# Patient Record
Sex: FEMALE | Race: WHITE | Employment: PART TIME | URBAN - METROPOLITAN AREA
[De-identification: names, ages, dates, MRNs, and addresses within clinical notes are randomized per-mention and may not be internally consistent; named-entity substitution may affect disease eponyms.]

---

## 2019-08-16 ENCOUNTER — OFFICE VISIT (OUTPATIENT)
Dept: FAMILY MEDICINE CLINIC | Age: 34
End: 2019-08-16
Payer: COMMERCIAL

## 2019-08-16 VITALS
SYSTOLIC BLOOD PRESSURE: 96 MMHG | RESPIRATION RATE: 16 BRPM | TEMPERATURE: 97.5 F | DIASTOLIC BLOOD PRESSURE: 64 MMHG | WEIGHT: 159 LBS | BODY MASS INDEX: 24.96 KG/M2 | HEIGHT: 67 IN | HEART RATE: 56 BPM

## 2019-08-16 DIAGNOSIS — Z80.3 FAMILY HISTORY OF MALIGNANT NEOPLASM OF BREAST IN RELATIVE DIAGNOSED WHEN YOUNGER THAN 45 YEARS OF AGE: ICD-10-CM

## 2019-08-16 DIAGNOSIS — Z12.31 ENCOUNTER FOR SCREENING MAMMOGRAM FOR MALIGNANT NEOPLASM OF BREAST: ICD-10-CM

## 2019-08-16 DIAGNOSIS — L30.9 VULVAR DERMATITIS: Primary | ICD-10-CM

## 2019-08-16 PROCEDURE — G8427 DOCREV CUR MEDS BY ELIG CLIN: HCPCS | Performed by: FAMILY MEDICINE

## 2019-08-16 PROCEDURE — 99203 OFFICE O/P NEW LOW 30 MIN: CPT | Performed by: FAMILY MEDICINE

## 2019-08-16 PROCEDURE — 1036F TOBACCO NON-USER: CPT | Performed by: FAMILY MEDICINE

## 2019-08-16 PROCEDURE — G8419 CALC BMI OUT NRM PARAM NOF/U: HCPCS | Performed by: FAMILY MEDICINE

## 2019-08-16 RX ORDER — CLOBETASOL PROPIONATE 0.5 MG/G
OINTMENT TOPICAL
Qty: 30 G | Refills: 1 | Status: SHIPPED | OUTPATIENT
Start: 2019-08-16 | End: 2020-06-19

## 2019-08-16 ASSESSMENT — PATIENT HEALTH QUESTIONNAIRE - PHQ9
1. LITTLE INTEREST OR PLEASURE IN DOING THINGS: 0
SUM OF ALL RESPONSES TO PHQ QUESTIONS 1-9: 0
2. FEELING DOWN, DEPRESSED OR HOPELESS: 0
SUM OF ALL RESPONSES TO PHQ QUESTIONS 1-9: 0
SUM OF ALL RESPONSES TO PHQ9 QUESTIONS 1 & 2: 0

## 2019-08-17 ASSESSMENT — ENCOUNTER SYMPTOMS
ABDOMINAL PAIN: 0
DIARRHEA: 0
BLOOD IN STOOL: 0
NAUSEA: 0
EYES NEGATIVE: 1
SHORTNESS OF BREATH: 0
VOMITING: 0

## 2019-09-20 ENCOUNTER — HOSPITAL ENCOUNTER (OUTPATIENT)
Dept: MAMMOGRAPHY | Age: 34
Discharge: HOME | End: 2019-09-20
Payer: COMMERCIAL

## 2019-09-20 DIAGNOSIS — Z12.39 BREAST CANCER SCREENING: ICD-10-CM

## 2019-09-20 PROCEDURE — 77063 BREAST TOMOSYNTHESIS BI: CPT

## 2020-06-19 ENCOUNTER — OFFICE VISIT (OUTPATIENT)
Dept: FAMILY MEDICINE CLINIC | Age: 35
End: 2020-06-19
Payer: COMMERCIAL

## 2020-06-19 VITALS
TEMPERATURE: 98.1 F | WEIGHT: 151.8 LBS | HEART RATE: 88 BPM | SYSTOLIC BLOOD PRESSURE: 110 MMHG | BODY MASS INDEX: 23.83 KG/M2 | HEIGHT: 67 IN | RESPIRATION RATE: 14 BRPM | DIASTOLIC BLOOD PRESSURE: 60 MMHG

## 2020-06-19 PROCEDURE — 99395 PREV VISIT EST AGE 18-39: CPT | Performed by: FAMILY MEDICINE

## 2020-06-19 ASSESSMENT — ENCOUNTER SYMPTOMS
EYES NEGATIVE: 1
NAUSEA: 0
ABDOMINAL PAIN: 0
VOMITING: 0
SHORTNESS OF BREATH: 0
BLOOD IN STOOL: 0
DIARRHEA: 0

## 2020-06-19 ASSESSMENT — PATIENT HEALTH QUESTIONNAIRE - PHQ9
2. FEELING DOWN, DEPRESSED OR HOPELESS: 0
1. LITTLE INTEREST OR PLEASURE IN DOING THINGS: 0
SUM OF ALL RESPONSES TO PHQ QUESTIONS 1-9: 0
SUM OF ALL RESPONSES TO PHQ9 QUESTIONS 1 & 2: 0
SUM OF ALL RESPONSES TO PHQ QUESTIONS 1-9: 0

## 2020-06-19 NOTE — PROGRESS NOTES
atraumatic. Right Ear: Tympanic membrane normal.      Left Ear: Tympanic membrane normal.      Mouth/Throat:      Mouth: Mucous membranes are moist.      Pharynx: No posterior oropharyngeal erythema. Eyes:      Conjunctiva/sclera: Conjunctivae normal.   Neck:      Musculoskeletal: Neck supple. Thyroid: No thyromegaly. Vascular: No carotid bruit. Cardiovascular:      Rate and Rhythm: Normal rate and regular rhythm. Heart sounds: No murmur. Pulmonary:      Effort: Pulmonary effort is normal.      Breath sounds: Normal breath sounds. No wheezing. Abdominal:      General: Bowel sounds are normal.      Palpations: Abdomen is soft. Tenderness: There is no abdominal tenderness. There is no guarding or rebound. Genitourinary:     General: Normal vulva. Vagina: No vaginal discharge. Comments: External genitalia normal.  Spec exam reveals parous cervical os without lesion. Pap  Collected. Bimanual exam within normal limits. Musculoskeletal:      Right shoulder: Normal. She exhibits normal range of motion and no crepitus. Cervical back: She exhibits spasm. Back:    Lymphadenopathy:      Cervical: No cervical adenopathy. Skin:     General: Skin is warm and dry. Findings: No rash. Neurological:      Mental Status: She is alert and oriented to person, place, and time.    Psychiatric:         Behavior: Behavior normal.             Immunization History   Administered Date(s) Administered    Tdap (Boostrix, Adacel) 02/23/2017         Health Maintenance   Topic Date Due    Flu vaccine (Season Ended) 09/01/2020    Cervical cancer screen  08/09/2022    Breast cancer screen  12/12/2026    DTaP/Tdap/Td vaccine (2 - Td) 02/23/2027    Hepatitis A vaccine  Aged Out    Hepatitis B vaccine  Aged Out    Hib vaccine  Aged Out    Meningococcal (ACWY) vaccine  Aged Out    Pneumococcal 0-64 years Vaccine  Aged Out    Varicella vaccine  Discontinued    HIV screen

## 2020-06-25 LAB — CYTOLOGY THIN PREP PAP: NORMAL

## 2020-06-26 ENCOUNTER — OFFICE VISIT (OUTPATIENT)
Dept: FAMILY MEDICINE CLINIC | Age: 35
End: 2020-06-26
Payer: COMMERCIAL

## 2020-06-26 ENCOUNTER — TELEPHONE (OUTPATIENT)
Dept: FAMILY MEDICINE CLINIC | Age: 35
End: 2020-06-26

## 2020-06-26 VITALS
SYSTOLIC BLOOD PRESSURE: 94 MMHG | WEIGHT: 151.4 LBS | DIASTOLIC BLOOD PRESSURE: 58 MMHG | HEIGHT: 67 IN | TEMPERATURE: 97.2 F | HEART RATE: 64 BPM | BODY MASS INDEX: 23.76 KG/M2 | RESPIRATION RATE: 14 BRPM

## 2020-06-26 PROCEDURE — 99213 OFFICE O/P EST LOW 20 MIN: CPT | Performed by: NURSE PRACTITIONER

## 2020-06-26 ASSESSMENT — ENCOUNTER SYMPTOMS
BLOOD IN STOOL: 0
SHORTNESS OF BREATH: 0
NAUSEA: 0
DIARRHEA: 0
CONSTIPATION: 0
VOMITING: 0

## 2020-06-26 NOTE — PROGRESS NOTES
range of motion. Comments: Firm lump of left breast at 3-4 o' clock position. Skin:     General: Skin is warm and dry. Neurological:      Mental Status: She is alert and oriented to person, place, and time. Deep Tendon Reflexes: Reflexes are normal and symmetric. Psychiatric:         Behavior: Behavior normal.         Thought Content: Thought content normal.         Judgment: Judgment normal.         No results found for this visit on 06/26/20. ASSESSMENT      1.  Lump of left breast        PLAN       Orders Placed This Encounter   Procedures    ERICKA DIGITAL DIAGNOSTIC W OR WO CAD BILATERAL     Standing Status:   Future     Standing Expiration Date:   8/26/2021     Scheduling Instructions:      Ultrasound if needed     Order Specific Question:   Reason for exam:     Answer:   Monday would be great     Diagnostic mammo ordered  Ultrasound if indicated  Follow up as previously scheduled      Electronically signed by ANI Morillo CNP on 6/26/2020 at 11:42 AM

## 2020-07-06 ENCOUNTER — HOSPITAL ENCOUNTER (OUTPATIENT)
Dept: WOMENS IMAGING | Age: 35
Discharge: HOME | End: 2020-07-06
Payer: COMMERCIAL

## 2020-07-06 PROCEDURE — 76642 ULTRASOUND BREAST LIMITED: CPT

## 2020-07-06 PROCEDURE — G0279 TOMOSYNTHESIS, MAMMO: HCPCS

## 2021-06-25 ENCOUNTER — OFFICE VISIT (OUTPATIENT)
Dept: FAMILY MEDICINE CLINIC | Age: 36
End: 2021-06-25
Payer: COMMERCIAL

## 2021-06-25 VITALS
BODY MASS INDEX: 24.81 KG/M2 | DIASTOLIC BLOOD PRESSURE: 66 MMHG | HEART RATE: 68 BPM | RESPIRATION RATE: 12 BRPM | HEIGHT: 66 IN | SYSTOLIC BLOOD PRESSURE: 108 MMHG | WEIGHT: 154.4 LBS

## 2021-06-25 DIAGNOSIS — N63.20 LEFT BREAST LUMP: ICD-10-CM

## 2021-06-25 DIAGNOSIS — Z01.419 ENCOUNTER FOR GYNECOLOGICAL EXAMINATION WITHOUT ABNORMAL FINDING: Primary | ICD-10-CM

## 2021-06-25 PROCEDURE — 99395 PREV VISIT EST AGE 18-39: CPT | Performed by: FAMILY MEDICINE

## 2021-06-25 SDOH — ECONOMIC STABILITY: FOOD INSECURITY: WITHIN THE PAST 12 MONTHS, THE FOOD YOU BOUGHT JUST DIDN'T LAST AND YOU DIDN'T HAVE MONEY TO GET MORE.: NEVER TRUE

## 2021-06-25 SDOH — ECONOMIC STABILITY: FOOD INSECURITY: WITHIN THE PAST 12 MONTHS, YOU WORRIED THAT YOUR FOOD WOULD RUN OUT BEFORE YOU GOT MONEY TO BUY MORE.: NEVER TRUE

## 2021-06-25 ASSESSMENT — PATIENT HEALTH QUESTIONNAIRE - PHQ9
1. LITTLE INTEREST OR PLEASURE IN DOING THINGS: 0
2. FEELING DOWN, DEPRESSED OR HOPELESS: 0
SUM OF ALL RESPONSES TO PHQ QUESTIONS 1-9: 0
SUM OF ALL RESPONSES TO PHQ9 QUESTIONS 1 & 2: 0
SUM OF ALL RESPONSES TO PHQ QUESTIONS 1-9: 0
SUM OF ALL RESPONSES TO PHQ QUESTIONS 1-9: 0

## 2021-06-25 ASSESSMENT — SOCIAL DETERMINANTS OF HEALTH (SDOH)
HOW HARD IS IT FOR YOU TO PAY FOR THE VERY BASICS LIKE FOOD, HOUSING, MEDICAL CARE, AND HEATING?: NOT HARD AT ALL
HOW HARD IS IT FOR YOU TO PAY FOR THE VERY BASICS LIKE FOOD, HOUSING, MEDICAL CARE, AND HEATING?: NOT HARD AT ALL

## 2021-06-25 NOTE — PROGRESS NOTES
2021    No George (:  1985) is a 30 y.o. female, here for a preventive medicine evaluation. Doing well except for a left breast lump. She noticed this last year and had a diagnostic mammogram and US, which was ok. She feels like the area is more prominent now. There is a family history of breast cancer. She is otherwise well. Takes no Rx meds. She works out regularly. Nonsmoker. Body mass index is 24.67 kg/m². She has a h/o an abnormal pap in the distant past.  She has had multiple normals since that time. Review of Systems   Constitutional: Negative for chills, fatigue and fever. HENT: Negative. Eyes: Negative. Respiratory: Negative for shortness of breath. Cardiovascular: Negative for chest pain, palpitations and leg swelling. Gastrointestinal: Negative for abdominal pain, blood in stool, diarrhea, nausea and vomiting. Genitourinary: Negative for dysuria and menstrual problem. Musculoskeletal: Negative for arthralgias and myalgias. Skin: Negative for rash. Neurological: Negative for dizziness and headaches. Hematological: Negative for adenopathy. Psychiatric/Behavioral: Negative. All other systems reviewed and are negative. Prior to Visit Medications    Medication Sig Taking?  Authorizing Provider   TURMERIC PO Take by mouth Yes Historical Provider, MD   COLLAGEN PO Take by mouth Yes Historical Provider, MD        No Known Allergies    Past Medical History:   Diagnosis Date    Migraine        Past Surgical History:   Procedure Laterality Date    SINUS SURGERY      WISDOM TOOTH EXTRACTION  2004       Social History     Socioeconomic History    Marital status:      Spouse name: Not on file    Number of children: Not on file    Years of education: Not on file    Highest education level: Not on file   Occupational History    Not on file   Tobacco Use    Smoking status: Never Smoker    Smokeless tobacco: Never Used   Vaping Use  Vaping Use: Never used   Substance and Sexual Activity    Alcohol use: Yes     Comment: Socially    Drug use: Never    Sexual activity: Yes     Partners: Male     Comment:    Other Topics Concern    Not on file   Social History Narrative    Not on file     Social Determinants of Health     Financial Resource Strain: Low Risk     Difficulty of Paying Living Expenses: Not hard at all   Food Insecurity: No Food Insecurity    Worried About Running Out of Food in the Last Year: Never true    [address] in the Last Year: Never true   Transportation Needs:     Lack of Transportation (Medical):  Lack of Transportation (Non-Medical):    Physical Activity:     Days of Exercise per Week:     Minutes of Exercise per Session:    Stress:     Feeling of Stress :    Social Connections:     Frequency of Communication with Friends and Family:     Frequency of Social Gatherings with Friends and Family:     Attends Nondenominational Services:     Active Member of Clubs or Organizations:     Attends Club or Organization Meetings:     Marital Status:    Intimate Partner Violence:     Fear of Current or Ex-Partner:     Emotionally Abused:     Physically Abused:     Sexually Abused:         Family History   Problem Relation Age of Onset    Breast Cancer Mother 44        Breast    High Blood Pressure Father     Cancer Father         Renal    Other Maternal Grandfather         Amyloidosis     Seizures Brother     Irritable Bowel Syndrome Paternal Grandmother        ADVANCE DIRECTIVE: N, <no information>    Vitals:    06/25/21 1333   BP: 108/66   Pulse: 68   Resp: 12   Weight: 154 lb 6.4 oz (70 kg)   Height: 5' 6.34\" (1.685 m)     Estimated body mass index is 24.67 kg/m² as calculated from the following:    Height as of this encounter: 5' 6.34\" (1.685 m). Weight as of this encounter: 154 lb 6.4 oz (70 kg). Physical Exam  Vitals and nursing note reviewed.    Constitutional:       General: She is not DTaP/Tdap/Td vaccine (2 - Td or Tdap) 02/23/2027    COVID-19 Vaccine  Completed    Hepatitis A vaccine  Aged Out    Hepatitis B vaccine  Aged Out    Hib vaccine  Aged Out    Meningococcal (ACWY) vaccine  Aged Out    Pneumococcal 0-64 years Vaccine  Aged Out    Varicella vaccine  Discontinued    HIV screen  Discontinued          ASSESSMENT/PLAN:    1. Encounter for gynecological examination without abnormal finding  -     PAP SMEAR  2. Left breast lump  -     ERICKA CHRISTINA DIGITAL DIAGNOSTIC BILATERAL; Future      Pap to lab    Diagnostic mammogram     Healthy diet and exercise    If pap normal, can do paps every 3 years. Follow up yearly and prn       An electronic signature was used to authenticate this note.     --Deniz Parada MD on 6/25/2021 at 3:17 PM

## 2021-06-27 ASSESSMENT — ENCOUNTER SYMPTOMS
NAUSEA: 0
BLOOD IN STOOL: 0
ABDOMINAL PAIN: 0
DIARRHEA: 0
EYES NEGATIVE: 1
SHORTNESS OF BREATH: 0
VOMITING: 0

## 2021-07-08 LAB — CYTOLOGY THIN PREP PAP: NORMAL

## 2021-07-15 ENCOUNTER — TELEPHONE (OUTPATIENT)
Dept: FAMILY MEDICINE CLINIC | Age: 36
End: 2021-07-15

## 2021-07-15 DIAGNOSIS — N63.20 LEFT BREAST LUMP: Primary | ICD-10-CM

## 2021-07-15 NOTE — TELEPHONE ENCOUNTER
Received a call from [facility] requesting an order for a left breast ultrasound limited. Pt is scheduled for a diagnostic b/l mammogram on 7/16/21 for left breast mass and they want to do an ultrasound at the same time. Order pended, [facility] will pull the order from [facility].

## 2021-07-16 ENCOUNTER — HOSPITAL ENCOUNTER (OUTPATIENT)
Dept: WOMENS IMAGING | Age: 36
Discharge: HOME | End: 2021-07-16
Payer: COMMERCIAL

## 2021-07-16 DIAGNOSIS — N63.20 LEFT BREAST LUMP: ICD-10-CM

## 2021-07-16 PROCEDURE — 76642 ULTRASOUND BREAST LIMITED: CPT

## 2021-07-16 PROCEDURE — G0279 TOMOSYNTHESIS, MAMMO: HCPCS

## 2021-07-19 ENCOUNTER — TELEPHONE (OUTPATIENT)
Dept: FAMILY MEDICINE CLINIC | Age: 36
End: 2021-07-19

## 2021-07-19 ENCOUNTER — TELEPHONE (OUTPATIENT)
Dept: CASE MANAGEMENT | Age: 36
End: 2021-07-19

## 2021-07-19 DIAGNOSIS — Z91.89 INCREASED RISK OF BREAST CANCER: Primary | ICD-10-CM

## 2021-07-19 DIAGNOSIS — Z80.3 FAMILY HISTORY OF BREAST CANCER IN MOTHER: ICD-10-CM

## 2021-07-19 NOTE — TELEPHONE ENCOUNTER
----- Message from Klaudia Palmerd sent at 7/19/2021 10:55 AM EDT -----  Subject: Message to Provider    QUESTIONS  Information for Provider? pt said her mri needs to be order, and   pre-certification needs to be sent over insurance company. Phone number   [phone], mri code [id number]. Pt said she is doing gentic counseling as   well.  ---------------------------------------------------------------------------  --------------  CALL BACK INFO  What is the best way for the office to contact you? OK to leave message on   voicemail  Preferred Call Back Phone Number? [phone]  ---------------------------------------------------------------------------  --------------  SCRIPT ANSWERS  Relationship to Patient?  Self

## 2021-07-19 NOTE — TELEPHONE ENCOUNTER
Name: Silvana Martinez  : 1985  MRN: [id number]    Oncology Navigation Follow-Up Note    Contact Type:  Telephone    Subjective: Returned message about requesting genetic evaluation and testing. States told was high risk during mammogram visit 2021 and interested in breast MRI. Objective: Dr. Kristin Grove, PCP; Family history clarified as follows: Mother breast cancer, age 52; dad renal cancer, age 62 and having surgery for newly diagnosed papillary sarcoma of his thyroid, age 61; paternal aunt breast cancer, age 52. Tyrer-Cuzick documented as 31% on recent bilateral diagnostic mammogram report. Assistance Needed: Genetic referral and appointment. Informed that she needs to call PCP office for MRI order since will need prior auth. Referrals: Completed genetic referral form and faxed to Dr. Radames Rene office for signature. Informed patient once order received by genetic office in [place] they we call to schedule her, approximate wait time 2-3 months. Education: Genetic process and scheduling. MRI ordering process. Verbalizes understanding. Questions addressed. Notes: Will follow to track for patient's order(s), appointment(s), results, and additional high risk care coordination, if needed.     Electronically signed by Will Welsh RN on 2021 at 11:09 AM

## 2021-07-20 ENCOUNTER — HOSPITAL ENCOUNTER (OUTPATIENT)
Dept: WOMENS IMAGING | Age: 36
Discharge: HOME | End: 2021-07-20
Payer: COMMERCIAL

## 2021-07-20 DIAGNOSIS — N63.21 MASS OF UPPER OUTER QUADRANT OF LEFT BREAST: ICD-10-CM

## 2021-07-20 PROCEDURE — 88305 TISSUE EXAM BY PATHOLOGIST: CPT

## 2021-07-20 PROCEDURE — C1894 INTRO/SHEATH, NON-LASER: HCPCS

## 2021-07-20 PROCEDURE — 77065 DX MAMMO INCL CAD UNI: CPT

## 2021-07-20 NOTE — LETTER
March 17, 2022     Aleisha Guzman MD  [address] [address]    Phone: [phone]  Fax: [phone]          RE: Daxa Silva   MRN: [id number]  YOB: 1985   Breast Imaging Exam Done On: 7/20/21      Dear Provider,    Thank you for allowing us to care for your patient. Amanda Carranza had a breast imaging exam with us on 7/20/21. At that time a Diagnostic Mammogram in 6 Months was recommended. The recommendations were due on 1/16/2022. A letter was previously sent to Joan Sewell her of the need for the exam.  As of this date, we do not have record of this exam being performed. We would appreciate your assistance in contacting the patient and scheduling the appointment.     Sincerely,    [address]

## 2021-07-20 NOTE — TELEPHONE ENCOUNTER
Patient would like scheduled at [facility] a Friday. Scheduled at [facility] main radiology on 8/6/21 @ 0800, 0730 arrival.   Feroz becker team will handle precert.   Patient notified of appt info via [email or web address] message at her request.

## 2021-07-21 ENCOUNTER — CLINICAL DOCUMENTATION (OUTPATIENT)
Dept: WOMENS IMAGING | Age: 36
End: 2021-07-21

## 2021-07-22 ENCOUNTER — TELEPHONE (OUTPATIENT)
Dept: ONCOLOGY | Age: 36
End: 2021-07-22

## 2021-09-03 ENCOUNTER — HOSPITAL ENCOUNTER (OUTPATIENT)
Dept: MRI IMAGING | Age: 36
Discharge: HOME | End: 2021-09-03
Payer: COMMERCIAL

## 2021-09-03 DIAGNOSIS — Z80.3 FAMILY HISTORY OF BREAST CANCER IN MOTHER: ICD-10-CM

## 2021-09-03 DIAGNOSIS — Z91.89 INCREASED RISK OF BREAST CANCER: ICD-10-CM

## 2021-09-03 PROCEDURE — 6360000004 HC RX CONTRAST MEDICATION: Performed by: FAMILY MEDICINE

## 2021-09-03 PROCEDURE — 77049 MRI BREAST C-+ W/CAD BI: CPT

## 2021-09-03 PROCEDURE — A9579 GAD-BASE MR CONTRAST NOS,1ML: HCPCS | Performed by: FAMILY MEDICINE

## 2021-09-03 RX ADMIN — GADOTERIDOL 15 ML: 279.3 INJECTION, SOLUTION INTRAVENOUS at 11:43

## 2021-11-09 ENCOUNTER — INITIAL CONSULT (OUTPATIENT)
Dept: ONCOLOGY | Age: 36
End: 2021-11-09
Payer: COMMERCIAL

## 2021-11-09 DIAGNOSIS — Z80.8 FAMILY HISTORY OF THYROID CANCER: ICD-10-CM

## 2021-11-09 DIAGNOSIS — Z80.3 FAMILY HISTORY OF BREAST CANCER: Primary | ICD-10-CM

## 2021-11-09 DIAGNOSIS — Z80.51 FAMILY HISTORY OF RENAL CANCER: ICD-10-CM

## 2021-11-09 PROCEDURE — 96040 PR GENETIC COUNSELING, EACH 30 MIN: CPT | Performed by: GENETIC COUNSELOR, MS

## 2021-11-10 NOTE — PROGRESS NOTES
cancers are typically diagnosed at younger ages (under age 46y) and occur in multiple generations of a family. Multiple individuals with the same type of cancer (example: breast or colorectal) or uncommon cancers (example: ovarian, pancreatic, male breast cancer) are also features of hereditary cancers. We discussed that Ms. Micaela Garcia families are somewhat concerning for a hereditary predisposition. One her maternal side, she has at least one relative with early onset breast cancer. One her paternal side, she has several relatives diagnosed with various cancers including renal, thyroid, and early onset breast and gastric cancers. In summary, Ms. Hattie Bethea meets the [address] [place] [address] (NCCN) guidelines for genetic testing of the BRCA1/2 genes based on having a paternal second degree relative (aunt) with breast cancer at age 39. The NCCN guidelines also recognize that an individual's personal and/or family history may be explained by more than one inherited cancer syndrome. Thus, a multi-gene panel may be more efficient, more cost effecting, and increases the yield of detecting a hereditary mutation which would impact medical management. Given her personal and/or family history, we recommend testing for the following genes at minimum: ELVIS, CHEK2, NBN, and PALB2. DISCUSSION  We discussed that the BRCA1/2 genes are the most common genes associated with hereditary breast and ovarian cancer. We also discussed that genetic testing is available for multiple other genes related to hereditary cancer. Some of these genes are known to carry a significant increased risk for several cancers including colon, breast, uterine, ovarian, stomach, and pancreatic cancer, while some of these genes are believed to have a moderate increased risk for breast and other cancers.  We discussed the possibility of finding a mutation in genes with limited information to guide medical management, as well we as the possibility of identifying variants of uncertain significance (VUS). We discussed the risks, benefits, and limitations of genetic testing. Possible test results were discussed as well as potential screening and prevention strategies. Specifically, we discussed increased breast cancer surveillance by mammogram and breast MRI as well as the option of prophylactic mastectomy. We discussed the recommendation for prophylactic oophorectomy for results which suggest an increased risk for ovarian cancer. Lastly, we discussed that the results of Ms. Sreedhar Schmidt genetic testing may be beneficial in defining her risk for cancer as well as for her family members. SUMMARY & PLAN  1) Ms. Eder Ryan meets the NCCN criteria for genetic testing based on her family history of early onset breast cancer. 2) Genetic testing via a multi-gene panel was recommended and offered to Ms. Eder Ryan. 3) Ms. Eder Ryan elected to proceed with the CancerNext Expanded + RNA Insight gene panel. 4) Ms. Eder Ryan is aware that she will receive a notification from [facility] if the out of pocket cost for testing exceeds $100 (based on individual insurance plan) and the option to proceed with the self pay price of $249.     5) Informed consent was obtained and a blood sample was sent to [facility]. We will call Ms. Eder Ryan with results as soon as they are available. A follow up appointment may be recommended. A summary letter with results and final medical management recommendations will be sent once available. A total of 40 minutes were spent face to face with Ms. Eder Ryan and 50% of the time was spent educating and counseling. The [address]  [facility] Program would be glad to offer our assistance should you have any questions or concerns about this information. Please feel free to contact us at [phone]. Karena Ritchie.  Cristiana Cummings, MS, [address]   Licensed Genetic Counselor

## 2021-11-30 ENCOUNTER — TELEPHONE (OUTPATIENT)
Dept: ONCOLOGY | Age: 36
End: 2021-11-30

## 2021-11-30 NOTE — TELEPHONE ENCOUNTER
[facility]   Hereditary Cancer Risk Assessment     Name: Leola Farley  YOB: 1985  Date of Results Disclosure: 11/30/21      HISTORY   Ms. Dominic Juarez was seen for genetic counseling at the request of Albert Ken MD due to her family history of cancer. At that time, Ms. Dominic Juarez chose to pursue genetic testing via the CancerNext Expanded + RNA gene panel. These results were discussed with Ms. Dominic Juarez via telephone. A summary of Ms. Samreen Man results and recommendations are below. RESULTS  [facility] CancerNext-Expanded Panel + RNAinsight: NEGATIVE - NO CLINICALLY SIGNIFICANT MUTATIONS DETECTED   This panel included the analysis of 77 genes associated with hereditary cancer including: AIP, ALK, APC, ELVIS, BAP1, BARD1, BLM, BMPR1A, BRCA1, BRCA2, BRIP1, CDC73, CDH1, CDK4, CDKN1B, CDKN2A, CHEK2, CTNNA1, DICER1, EGFR, EGLN1, EPCAM, FANCC, FH, FLCN, GALNT12, GREM1, HOXB13, KIF1B, KIT1, LZTR1, MAX, MEN1, MET, MITF, MLH1, MSH2, MSH3, MSH6, MUTYH, NBN, NF1, NF2, NTHL1, PALB2, PDGFRA, PHOX2B, PMS2, POLD1, POLE, POT1, VGBJM9Y, PTCH1, PTEN, RAD51C, RAD51D, RB1, RECQL, RET, SDHA, SDHAF2, SDHB, SDHC, ,SDHD, SMAD4, SMARCA4, SMARCB1, SMARCE1, STK11, SUFU, XGYD719, TP53, TSC1, TSC2, VHL, and XRCC2. In addition, no clinically relevant aberrant RNA transcripts were detected in select genes. Please refer to genetic test report for technical details. We discussed that Ms. Samreen Man negative test result greatly reduces the likelihood that she carries a hereditary gene mutation. However, it is possible that her family history of cancer is due to a hereditary mutation which she did not inherit. It is also possible that her family history of cancer may be due to a gene for which testing was not performed or which has yet to be discovered. RECOMMENDATIONS  1) The outcome of Ms. Samreen Man genetic test results do not affect her current cancer treatment.  Ms. Dominic Juarez should continue cancer treatment and surveillance as directed by her physicians. 1) While Ms. Ranjith Leo does not carry a known hereditary gene mutation, her risk for breast cancer may still be elevated due to her remaining family history of breast cancer. Based on Ms. Jazmyne Altamirano personal risk factors and family history of breast cancer, her estimated lifetime risk for breast cancer is 31.0% according to the [employer] risk model. The  [facility] (NCCN) recommends that women with a lifetime risk of breast cancer 20% or higher consider the following screening and risk reducing options:     NCCN Recommendation Age to Begin Frequency    Breast awareness - Women should be familiar with their breasts and promptly report changes to their healthcare provider. Periodic, consistent breast self-examination (BSE) may be beneficial  Individualized  N/A    Clinical Breast Examination  Individualized Every 6-12 months   Breast MRI with contrast  29years old  Annual   Mammogram (consider tomosynthesis)  29years old  Annual    Consider risk reducing agents (i.e. Tamoxifen)  Individualized  N/A    *age to begin screening is based on the onset of breast cancer in Ms. Jazmyne Altamirano family    2) Ms. Ranjith Leo should continue general population cancer screening guidelines as directed by her physicians. RECOMMENDATIONS FOR FAMILY MEMBERS   1) Genetic testing is not recommended for Ms. Jazmyne Altamirano children based on her negative test results. However, this recommendation does not take into consideration any family history of cancer in their paternal family. 2) It is possible that the cancers in Ms. Jazmyne Altamirano family are due to a hereditary gene mutation that she did not inherit. Therefore, her relatives (particularly those with a current or previous cancer diagnosis) may consider genetic counseling and testing to clarify this possibility.  Relatives may contact the [address] [place], [place] [address]todd Shook at [phone] or locate a genetic counselor at [employer]. [employer].     3) We encourage Ms. Glenn Santizo relatives to discuss their family history of cancer with their physicians to determine the most appropriate cancer screening recommendations. Ms. Glenn Santizo female relatives may benefit from a formal breast cancer risk assessment by the [place] or Luis Vance risk models to determine if additional breast cancer screening is warranted. SUMMARY & PLAN   1) Ms. Glenn Santizo genetic test results are negative meaning there were no clinically significant mutations detected in the 77 genes analyzed. 2) Ms. Glenn Santizo lifetime risk for breast cancer is 30.0% according to the Luis Vance risk model. She may consider the NCCN guidelines outlined above for breast cancer surveillance. She should continue annual breast MRI screening staggered 6 months apart from annual mammograms. 3) There are no other changes in medical management for Ms. Mila Blackwood based on her negative genetic test results. 4) We encourage Ms. Mila Blackwood to contact us every 1-2 years to determine if there are any new genetic testing or research options available. 5) We encourage Ms. Mila Blackwood to contact us with updates to her personal and/or familys cancer history as this information may alter our assessment and/or recommendations. The [facility] National Program would be glad to offer our assistance should you have any questions or concerns about this information. Please feel free to contact us at [phone]. [facility].  Marc Singh MS, [facility]   Licensed Genetic Counselor         CC:  Ms. Viral Arechiga MD

## 2022-03-21 ENCOUNTER — TELEPHONE (OUTPATIENT)
Dept: FAMILY MEDICINE CLINIC | Age: 37
End: 2022-03-21

## 2022-03-21 NOTE — TELEPHONE ENCOUNTER
COPIED FROM     Please contact the patient and arrange the follow up breast imaging if she would like to schedule it.  CG    See media scan from 03/21/2022

## 2022-03-22 NOTE — TELEPHONE ENCOUNTER
Spoke with patient. States that she recently moved to [place] and is establishing care with a provider in that area. She will have follow up imaging completed at a women's center there, once she is settled.

## 2022-07-13 NOTE — PROGRESS NOTES
Breast Biopsy Flowsheet/Post-Operative Care    Date of Procedure: 7/20/2021  Physician: Dr. Rafal Ventura  Technologist: Tato De Leon guided breast biopsy  Lesion type: Palpable  Breast: left    Clock face position: Site #1: axillary tail         Primary Method of Detection: Palpation      Microcalcification's: no   Distribution: N/A    Asymmetry: asymmetric    Biopsy Method:   Sertera:    Site # 1    Gauge: 14    # of Passes: 5     Clip: Tribell        Pre-Op Assessment: (BI-RADS)   4. Suspicious Abnormality    Patient Tolerated Procedure: good  Complications: n/a  Comments: n/a    Post Operative Care  Steri strips: Yes  Dressing: Gauze, Tape   Ice Applied to Site:  No  Evidence of Bleeding:  No    Pain Verbalized: No      Written Discharge Instructions: Yes  Condition at Discharge: good  Time of Discharge: 1120    Electronically signed by Dada Colunga on 7/20/2021 at 11:28 AM
[facility] [address]  Pre-Biopsy Assessment      Patient Education    Written information about procedure Yes  left   Procedural steps explained Yes Ultrasound Biopsy   Post-op potential: bruising, hematoma, pain Yes    Self-care: activity, care of dressing Yes    Patient verbalized understanding Yes    Consent signed and witnessed Yes      Hormone Therapy Status: n/a    Recent Medication: N/A Last Dose: n/a                                     Hormone Replacement Therapy: no    Previous Breast Biopsy: no    Previous Diagnosis Cancer: no    Hysterectomy:no    Emotional Status: Calm    Language or Physical Barriers: n/a    Comments: n/a      Electronically signed by Trev Mcgee on 7/20/2021 at 10:42 AM
No